# Patient Record
Sex: MALE | Race: WHITE | NOT HISPANIC OR LATINO | URBAN - METROPOLITAN AREA
[De-identification: names, ages, dates, MRNs, and addresses within clinical notes are randomized per-mention and may not be internally consistent; named-entity substitution may affect disease eponyms.]

---

## 2020-05-16 ENCOUNTER — TELEPHONE (OUTPATIENT)
Dept: URGENT CARE | Age: 60
End: 2020-05-16

## 2020-05-16 ENCOUNTER — OFFICE VISIT (OUTPATIENT)
Dept: URGENT CARE | Age: 60
End: 2020-05-16

## 2020-05-16 DIAGNOSIS — R11.0 NAUSEA: Primary | ICD-10-CM

## 2020-05-16 DIAGNOSIS — J06.9 ACUTE URI: ICD-10-CM

## 2020-05-16 PROCEDURE — 99203 OFFICE O/P NEW LOW 30 MIN: CPT | Performed by: FAMILY MEDICINE

## 2020-05-16 ASSESSMENT — PAIN SCALES - GENERAL: PAINLEVEL: 0

## 2021-04-19 ENCOUNTER — APPOINTMENT (OUTPATIENT)
Dept: GENERAL RADIOLOGY | Age: 61
End: 2021-04-19
Attending: NURSE PRACTITIONER
Payer: COMMERCIAL

## 2021-04-19 ENCOUNTER — HOSPITAL ENCOUNTER (OUTPATIENT)
Age: 61
Discharge: HOME | End: 2021-04-19
Payer: COMMERCIAL

## 2021-04-19 VITALS
OXYGEN SATURATION: 97 % | RESPIRATION RATE: 17 BRPM | WEIGHT: 221.81 LBS | HEART RATE: 81 BPM | SYSTOLIC BLOOD PRESSURE: 141 MMHG | DIASTOLIC BLOOD PRESSURE: 83 MMHG | TEMPERATURE: 97 F

## 2021-04-19 DIAGNOSIS — M54.12 RADICULAR PAIN OF SHOULDER: Primary | ICD-10-CM

## 2021-04-19 DIAGNOSIS — M25.511 ACUTE PAIN OF RIGHT SHOULDER: ICD-10-CM

## 2021-04-19 PROCEDURE — 73030 X-RAY EXAM OF SHOULDER: CPT | Performed by: NURSE PRACTITIONER

## 2021-04-19 PROCEDURE — 99203 OFFICE O/P NEW LOW 30 MIN: CPT | Performed by: NURSE PRACTITIONER

## 2021-04-19 RX ORDER — METHYLPREDNISOLONE 4 MG/1
TABLET ORAL
Qty: 1 PACKAGE | Refills: 0 | Status: SHIPPED | OUTPATIENT
Start: 2021-04-19

## 2021-04-19 NOTE — ED PROVIDER NOTES
Patient Seen in: Immediate [facility]      History   Patient presents with:  Shoulder Pain    Stated Complaint: right shoulder pain    HPI/Subjective:   61-year-old male complains of right shoulder pain on and off.   Patient he had a injury to the right and nursing note reviewed. Constitutional:       Appearance: Normal appearance. HENT:      Head: Atraumatic. Nose: Nose normal.   Eyes:      Extraocular Movements: Extraocular movements intact.       Conjunctiva/sclera: Conjunctivae normal.      Pu test, differential diagnosis, treatment plan, warning signs and symptoms which should prompt immediate return. The patient and/or family expressed clear understanding of these instructions and agrees to the following plan provided.   The patient and/or fam

## 2021-05-26 VITALS
OXYGEN SATURATION: 96 % | HEART RATE: 101 BPM | WEIGHT: 216 LBS | DIASTOLIC BLOOD PRESSURE: 85 MMHG | SYSTOLIC BLOOD PRESSURE: 131 MMHG | RESPIRATION RATE: 16 BRPM | TEMPERATURE: 98.7 F

## (undated) NOTE — LETTER
Date & Time: 4/19/2021, 11:10 AM  Patient: Eric Verdin  Encounter Provider(s):    EDILBERTO Florence       To Whom It May Concern:    Loreta Clinton was seen and treated in our department on 4/19/2021.  He should not return to work until Thursday, A